# Patient Record
Sex: FEMALE | Race: ASIAN | NOT HISPANIC OR LATINO | URBAN - METROPOLITAN AREA
[De-identification: names, ages, dates, MRNs, and addresses within clinical notes are randomized per-mention and may not be internally consistent; named-entity substitution may affect disease eponyms.]

---

## 2020-03-06 ENCOUNTER — INPATIENT (INPATIENT)
Facility: HOSPITAL | Age: 30
LOS: 1 days | Discharge: HOME | End: 2020-03-08
Attending: SPECIALIST

## 2020-03-06 VITALS — SYSTOLIC BLOOD PRESSURE: 118 MMHG | HEART RATE: 80 BPM | DIASTOLIC BLOOD PRESSURE: 81 MMHG

## 2020-03-06 LAB
BASOPHILS # BLD AUTO: 0.01 K/UL — SIGNIFICANT CHANGE UP (ref 0–0.2)
BASOPHILS NFR BLD AUTO: 0.1 % — SIGNIFICANT CHANGE UP (ref 0–2)
BLD GP AB SCN SERPL QL: NEGATIVE — SIGNIFICANT CHANGE UP
EOSINOPHIL # BLD AUTO: 0.03 K/UL — SIGNIFICANT CHANGE UP (ref 0–0.5)
EOSINOPHIL NFR BLD AUTO: 0.4 % — SIGNIFICANT CHANGE UP (ref 0–6)
HCT VFR BLD CALC: 36.5 % — SIGNIFICANT CHANGE UP (ref 34.5–45)
HGB BLD-MCNC: 11.5 G/DL — SIGNIFICANT CHANGE UP (ref 11.5–15.5)
IMM GRANULOCYTES NFR BLD AUTO: 0.3 % — SIGNIFICANT CHANGE UP (ref 0–1.5)
LYMPHOCYTES # BLD AUTO: 1.51 K/UL — SIGNIFICANT CHANGE UP (ref 1–3.3)
LYMPHOCYTES # BLD AUTO: 19.9 % — SIGNIFICANT CHANGE UP (ref 13–44)
MCHC RBC-ENTMCNC: 28.2 PG — SIGNIFICANT CHANGE UP (ref 27–34)
MCHC RBC-ENTMCNC: 31.5 % — LOW (ref 32–36)
MCV RBC AUTO: 89.5 FL — SIGNIFICANT CHANGE UP (ref 80–100)
MONOCYTES # BLD AUTO: 0.5 K/UL — SIGNIFICANT CHANGE UP (ref 0–0.9)
MONOCYTES NFR BLD AUTO: 6.6 % — SIGNIFICANT CHANGE UP (ref 2–14)
NEUTROPHILS # BLD AUTO: 5.52 K/UL — SIGNIFICANT CHANGE UP (ref 1.8–7.4)
NEUTROPHILS NFR BLD AUTO: 72.7 % — SIGNIFICANT CHANGE UP (ref 43–77)
NRBC # FLD: 0 K/UL — SIGNIFICANT CHANGE UP (ref 0–0)
PLATELET # BLD AUTO: 199 K/UL — SIGNIFICANT CHANGE UP (ref 150–400)
PMV BLD: 10.5 FL — SIGNIFICANT CHANGE UP (ref 7–13)
RBC # BLD: 4.08 M/UL — SIGNIFICANT CHANGE UP (ref 3.8–5.2)
RBC # FLD: 13.7 % — SIGNIFICANT CHANGE UP (ref 10.3–14.5)
RH IG SCN BLD-IMP: POSITIVE — SIGNIFICANT CHANGE UP
WBC # BLD: 7.59 K/UL — SIGNIFICANT CHANGE UP (ref 3.8–10.5)
WBC # FLD AUTO: 7.59 K/UL — SIGNIFICANT CHANGE UP (ref 3.8–10.5)

## 2020-03-06 RX ORDER — CITRIC ACID/SODIUM CITRATE 300-500 MG
15 SOLUTION, ORAL ORAL EVERY 6 HOURS
Refills: 0 | Status: DISCONTINUED | OUTPATIENT
Start: 2020-03-06 | End: 2020-03-07

## 2020-03-06 RX ORDER — ONDANSETRON 8 MG/1
4 TABLET, FILM COATED ORAL ONCE
Refills: 0 | Status: COMPLETED | OUTPATIENT
Start: 2020-03-06 | End: 2020-03-06

## 2020-03-06 RX ORDER — SODIUM CHLORIDE 9 MG/ML
1000 INJECTION, SOLUTION INTRAVENOUS
Refills: 0 | Status: DISCONTINUED | OUTPATIENT
Start: 2020-03-06 | End: 2020-03-06

## 2020-03-06 RX ORDER — OXYTOCIN 10 UNIT/ML
333.33 VIAL (ML) INJECTION
Qty: 20 | Refills: 0 | Status: DISCONTINUED | OUTPATIENT
Start: 2020-03-06 | End: 2020-03-07

## 2020-03-06 RX ORDER — SODIUM CHLORIDE 9 MG/ML
1000 INJECTION, SOLUTION INTRAVENOUS ONCE
Refills: 0 | Status: DISCONTINUED | OUTPATIENT
Start: 2020-03-06 | End: 2020-03-08

## 2020-03-06 RX ORDER — SODIUM CHLORIDE 9 MG/ML
1000 INJECTION, SOLUTION INTRAVENOUS
Refills: 0 | Status: DISCONTINUED | OUTPATIENT
Start: 2020-03-06 | End: 2020-03-07

## 2020-03-06 RX ADMIN — ONDANSETRON 4 MILLIGRAM(S): 8 TABLET, FILM COATED ORAL at 23:12

## 2020-03-06 RX ADMIN — SODIUM CHLORIDE 125 MILLILITER(S): 9 INJECTION, SOLUTION INTRAVENOUS at 14:11

## 2020-03-06 NOTE — OB PROVIDER H&P - PMH
Left tubal pregnancy, unspecified whether intrauterine pregnancy present    Vaginal delivery  2018 female 5lbs 6 oz

## 2020-03-06 NOTE — OB PROVIDER H&P - HISTORY OF PRESENT ILLNESS
Ms. Fuentes is a 29 year old  at 39w0d who presents for induction due to IUGR, EFW 2400g. Dopplers unknown.   Denies CTX, LOF, VB. Patient reports normal fetal movement.  Patient is GBS negative.   PNC uncomplicated, denies history of gestational hypertension, gestational diabetes, preeclampsia,     OBHx: . History of 2 SAB occurring at age 19 and 23 without D/C. One ectopic pregnancy at age 26 treated with a left salpingectomy at [place].   One female FT  on 2018 with IUGR at 5#6.  GYNHx: Left salpingectomy for ectopic pregnancy at [place] in 2017. Denies fibroids, ovarian cysts, abnormal paps, STIs.  PMHx: Denies significant PMHx    Surgical HX: as above  Meds: Daily prenatal vitamins.  Allergies: Shellfish (scratchy throat), NKDA, NKEA.     FH: Brain tumor in cousin   Maternal uncle and grandmother diabetes.    SH: Prior history of smoking 2-3 cigarettes per day for about 10 years, stopped 3 years ago. Prior to pregnancy drank on average 2-3 8 ounce glasses of alcohol in a week. No alcohol during pregnancy.   Prior history of daily marijuana use for about 10 years stopping at age 26.

## 2020-03-06 NOTE — OB PROVIDER H&P - ASSESSMENT
29 year old  presents for induction due to IUGR, EFW 2400g  Labor: admit to L&D  -PO cytotec  -routine labs  -EFM/toco  -NPO, IV hydration  Fetal: cat 1 tracing, fetal status reassuring  GBS: neg  Analgesia: desires epidural      d/w Dr. Graf  Note by Raza Melo, PA-S  Reviewed by CHIQUITA Bermudez, PGY-2

## 2020-03-06 NOTE — CHART NOTE - NSCHARTNOTEFT_GEN_A_CORE
R1 OB Tracing Note    T(C): 36.9 (03-06-20 @ 22:15), Max: 36.9 (03-06-20 @ 17:31)  HR: 56 (03-06-20 @ 22:14) (56 - 80)  BP: 129/78 (03-06-20 @ 22:14) (112/63 - 129/78)  RR: 18 (03-06-20 @ 19:08) (18 - 18)  SpO2: --    EFM:  130 bpm, mod riana with periods of min riana, +accels, -decels  [place]: cx irregular on toco    A/P 29y P1 admitted for IUGR IOL  -Cont PO  -Cat 1 tracing    [place] PGY1

## 2020-03-07 LAB — T PALLIDUM AB TITR SER: NEGATIVE — SIGNIFICANT CHANGE UP

## 2020-03-07 RX ORDER — SIMETHICONE 80 MG/1
80 TABLET, CHEWABLE ORAL EVERY 4 HOURS
Refills: 0 | Status: DISCONTINUED | OUTPATIENT
Start: 2020-03-07 | End: 2020-03-08

## 2020-03-07 RX ORDER — IBUPROFEN 200 MG
600 TABLET ORAL EVERY 6 HOURS
Refills: 0 | Status: DISCONTINUED | OUTPATIENT
Start: 2020-03-07 | End: 2020-03-08

## 2020-03-07 RX ORDER — OXYTOCIN 10 UNIT/ML
333.33 VIAL (ML) INJECTION
Qty: 20 | Refills: 0 | Status: DISCONTINUED | OUTPATIENT
Start: 2020-03-07 | End: 2020-03-07

## 2020-03-07 RX ORDER — DIBUCAINE 1 %
1 OINTMENT (GRAM) RECTAL EVERY 6 HOURS
Refills: 0 | Status: DISCONTINUED | OUTPATIENT
Start: 2020-03-07 | End: 2020-03-08

## 2020-03-07 RX ORDER — OXYCODONE HYDROCHLORIDE 5 MG/1
5 TABLET ORAL
Refills: 0 | Status: DISCONTINUED | OUTPATIENT
Start: 2020-03-07 | End: 2020-03-08

## 2020-03-07 RX ORDER — IBUPROFEN 200 MG
600 TABLET ORAL EVERY 6 HOURS
Refills: 0 | Status: COMPLETED | OUTPATIENT
Start: 2020-03-07 | End: 2021-02-03

## 2020-03-07 RX ORDER — BENZOCAINE 10 %
1 GEL (GRAM) MUCOUS MEMBRANE EVERY 6 HOURS
Refills: 0 | Status: DISCONTINUED | OUTPATIENT
Start: 2020-03-07 | End: 2020-03-08

## 2020-03-07 RX ORDER — IBUPROFEN 200 MG
1 TABLET ORAL
Qty: 0 | Refills: 0 | DISCHARGE
Start: 2020-03-07

## 2020-03-07 RX ORDER — OXYCODONE HYDROCHLORIDE 5 MG/1
5 TABLET ORAL ONCE
Refills: 0 | Status: DISCONTINUED | OUTPATIENT
Start: 2020-03-07 | End: 2020-03-08

## 2020-03-07 RX ORDER — GLYCERIN ADULT
1 SUPPOSITORY, RECTAL RECTAL AT BEDTIME
Refills: 0 | Status: DISCONTINUED | OUTPATIENT
Start: 2020-03-07 | End: 2020-03-08

## 2020-03-07 RX ORDER — DIPHENHYDRAMINE HCL 50 MG
25 CAPSULE ORAL EVERY 6 HOURS
Refills: 0 | Status: DISCONTINUED | OUTPATIENT
Start: 2020-03-07 | End: 2020-03-08

## 2020-03-07 RX ORDER — MAGNESIUM HYDROXIDE 400 MG/1
30 TABLET, CHEWABLE ORAL
Refills: 0 | Status: DISCONTINUED | OUTPATIENT
Start: 2020-03-07 | End: 2020-03-08

## 2020-03-07 RX ORDER — HYDROCORTISONE 1 %
1 OINTMENT (GRAM) TOPICAL EVERY 6 HOURS
Refills: 0 | Status: DISCONTINUED | OUTPATIENT
Start: 2020-03-07 | End: 2020-03-08

## 2020-03-07 RX ORDER — LANOLIN
1 OINTMENT (GRAM) TOPICAL EVERY 6 HOURS
Refills: 0 | Status: DISCONTINUED | OUTPATIENT
Start: 2020-03-07 | End: 2020-03-08

## 2020-03-07 RX ORDER — AER TRAVELER 0.5 G/1
1 SOLUTION RECTAL; TOPICAL EVERY 4 HOURS
Refills: 0 | Status: DISCONTINUED | OUTPATIENT
Start: 2020-03-07 | End: 2020-03-08

## 2020-03-07 RX ORDER — KETOROLAC TROMETHAMINE 30 MG/ML
30 SYRINGE (ML) INJECTION ONCE
Refills: 0 | Status: DISCONTINUED | OUTPATIENT
Start: 2020-03-07 | End: 2020-03-07

## 2020-03-07 RX ORDER — TETANUS TOXOID, REDUCED DIPHTHERIA TOXOID AND ACELLULAR PERTUSSIS VACCINE, ADSORBED 5; 2.5; 8; 8; 2.5 [IU]/.5ML; [IU]/.5ML; UG/.5ML; UG/.5ML; UG/.5ML
0.5 SUSPENSION INTRAMUSCULAR ONCE
Refills: 0 | Status: DISCONTINUED | OUTPATIENT
Start: 2020-03-07 | End: 2020-03-08

## 2020-03-07 RX ORDER — SODIUM CHLORIDE 9 MG/ML
3 INJECTION INTRAMUSCULAR; INTRAVENOUS; SUBCUTANEOUS EVERY 8 HOURS
Refills: 0 | Status: DISCONTINUED | OUTPATIENT
Start: 2020-03-07 | End: 2020-03-08

## 2020-03-07 RX ORDER — PRAMOXINE HYDROCHLORIDE 150 MG/15G
1 AEROSOL, FOAM RECTAL EVERY 4 HOURS
Refills: 0 | Status: DISCONTINUED | OUTPATIENT
Start: 2020-03-07 | End: 2020-03-08

## 2020-03-07 RX ORDER — ACETAMINOPHEN 500 MG
975 TABLET ORAL
Refills: 0 | Status: DISCONTINUED | OUTPATIENT
Start: 2020-03-07 | End: 2020-03-08

## 2020-03-07 RX ADMIN — Medication 1000 MILLIUNIT(S)/MIN: at 02:09

## 2020-03-07 RX ADMIN — Medication 30 MILLIGRAM(S): at 04:00

## 2020-03-07 RX ADMIN — Medication 600 MILLIGRAM(S): at 15:55

## 2020-03-07 RX ADMIN — Medication 600 MILLIGRAM(S): at 14:59

## 2020-03-07 RX ADMIN — SODIUM CHLORIDE 3 MILLILITER(S): 9 INJECTION INTRAMUSCULAR; INTRAVENOUS; SUBCUTANEOUS at 15:00

## 2020-03-07 RX ADMIN — Medication 1 TABLET(S): at 14:59

## 2020-03-07 NOTE — CHART NOTE - NSCHARTNOTEFT_GEN_A_CORE
R1 Note 03-07-20 @ 01:00    Pt examined for progression    VITALS:  T(C): 36.9 (03-06-20 @ 22:15), Max: 36.9 (03-06-20 @ 17:31)  HR: 55 (03-07-20 @ 00:58) (51 - 80)  BP: 137/77 (03-07-20 @ 00:45) (112/63 - 137/77)  RR: 18 (03-06-20 @ 19:08) (18 - 18)  SpO2: 100% (03-07-20 @ 00:53) (100% - 100%)    EFM:  mod riana with periods of min riana, +accels -decels  [place]: Ctx irregular  VE: 3.5/60/-2    IMPRESSION:   FHR Category: 1  Additional:    PLAN:  Cont PO  Call for epi    d/w Dr. Homar Ramírez PGY1

## 2020-03-07 NOTE — LACTATION INITIAL EVALUATION - LACTATION INTERVENTIONS
Instructed and assisted with positioning to facilitate proper latch.  Infant is less than 24 hours old and reluctant at this time.  Safe skin to skin encouraged with frequent attempts.  Reviewed feeding log and taught hand expression.  Discussed outpatient resources available, warm line, breastfeeding support group .  Encouraged to call for assistance, as needed./initiate skin to skin/initiate hand expression routine

## 2020-03-07 NOTE — DISCHARGE NOTE OB - MATERIALS PROVIDED
Back To Sleep Handout/[facility]  Screening Program/Breastfeeding Log/[facility] Hearing Screen Program/Breastfeeding Mother’s Support Group Information/Guide to Postpartum Care/Shaken Baby Prevention Handout/Tdap Vaccination (VIS Pub Date: 2012)/Breastfeeding Guide and Packet/Birth Certificate Instructions/Vaccinations/  Immunization Record

## 2020-03-07 NOTE — OB POSTPARTUM EVENT NOTE - NS_EVENTPTSUMMARY1_OBGYN_ALL_OB_FT
lying- 114/75 HR 58 sitting- 114/59 HR 54 standing 105/55 HR 61. pt asymptomatic. ambulating in room. denies dizziness. tolerating regular diet.

## 2020-03-07 NOTE — DISCHARGE NOTE OB - CARE PLAN
Principal Discharge DX:	Vaginal delivery  Goal:	routine postpartum care  Assessment and plan of treatment:	follow up in 4 weeks/ regular diet & activity as tolerate

## 2020-03-07 NOTE — OB PROVIDER DELIVERY SUMMARY - NSPROVIDERDELIVERYNOTE_OBGYN_ALL_OB_FT
Spontaneous vaginal delivery of liveborn female infant from OA position. Head, shoulder and body delivered easily. Cord was clamped and cut and infant handed to mother. Spontaneous delivery of intact placenta. Inspection of the perineum revealed intact perineum, sulci and cervix. Fundus was firm, 2cm below umbilicus in midline. Sponge, lap and needle count was correct x2.

## 2020-03-07 NOTE — OB RN DELIVERY SUMMARY - NS_SEPSISRSKCALC_OBGYN_ALL_OB_FT
GBS status in the 'Prenatal Lab tests/results section' on the OB RN Patient Profile must be documented. EOS calculated successfully. EOS Risk Factor: 0.5/1000 live births ([facility] national incidence); GA=39w1d; Temp=98.42; ROM=0.15; GBS='Negative'; Antibiotics='No antibiotics or any antibiotics < 2 hrs prior to birth'

## 2020-03-07 NOTE — OB RN PATIENT PROFILE - ALERT: PERTINENT HISTORY
Fetal Non-Stress Test (NST)/20 Week Level II Sonogram/Ultra Screen at 12 Weeks/1st Trimester Sonogram/BioPhysical Profile(s)

## 2020-03-07 NOTE — DISCHARGE NOTE OB - CARE PROVIDER_API CALL
Lola Graf)  Obstetrics and Gynecology  [address] [place] [address], [address]  [place] [address]  Phone: [phone]  Fax: [phone]  Follow Up Time:

## 2020-03-07 NOTE — DISCHARGE NOTE OB - PATIENT PORTAL LINK FT
You can access the FollowMyHealth Patient Portal offered by [facility] by registering at the following website: [email or web address]. By joining [facility]’s FollowMyHealth portal, you will also be able to view your health information using other applications (apps) compatible with our system.

## 2020-03-08 VITALS
OXYGEN SATURATION: 99 % | RESPIRATION RATE: 18 BRPM | DIASTOLIC BLOOD PRESSURE: 55 MMHG | HEART RATE: 74 BPM | SYSTOLIC BLOOD PRESSURE: 116 MMHG | TEMPERATURE: 98 F

## 2020-03-08 RX ADMIN — Medication 600 MILLIGRAM(S): at 05:33

## 2020-03-08 RX ADMIN — Medication 600 MILLIGRAM(S): at 06:18

## 2020-03-08 NOTE — PROGRESS NOTE ADULT - SUBJECTIVE AND OBJECTIVE BOX
ANESTHESIA POST-EPIDURAL CHECK    29y Female s/p  DAY 1     No COMPLAINTS    NO APPARENT ANESTHESIA COMPLICATIONS

## 2020-11-06 NOTE — OB RN PATIENT PROFILE - NS PRO AD PATIENT TYPE
"Subjective   57-year-old female presents for evaluation of acute on chronic atraumatic left knee pain.  She states that she suffers from chronic knee pain but that it has been increased over the past 2 weeks.  She denies any fevers or systemic symptoms.  She states that over the past few days she has noted some redness and warmth to her left knee.  No drainage.  No paresthesias.  Pain is currently 7 out of 10 in severity with no aggravating or alleviating factors.  No preceding injury or trauma.          Review of Systems   Constitutional: Negative for fever.   Musculoskeletal: Positive for arthralgias and joint swelling.   All other systems reviewed and are negative.      Past Medical History:   Diagnosis Date   • Bulging lumbar disc    • Depression    • Hypertension    • Injury of back    • Panic attack    • Small intestine disorder     \"I have 2 spots on my small intestine\"       Allergies   Allergen Reactions   • Bactrim [Sulfamethoxazole-Trimethoprim] Itching and Rash     Has tolerated in the past       Past Surgical History:   Procedure Laterality Date   •  SECTION     • HYSTERECTOMY         History reviewed. No pertinent family history.    Social History     Socioeconomic History   • Marital status: Legally      Spouse name: Not on file   • Number of children: Not on file   • Years of education: Not on file   • Highest education level: Not on file   Tobacco Use   • Smoking status: Never Smoker   • Smokeless tobacco: Never Used   Substance and Sexual Activity   • Alcohol use: Yes     Comment: occ   • Drug use: No   • Sexual activity: Defer           Objective   Physical Exam  Vitals signs and nursing note reviewed.   Constitutional:       General: She is not in acute distress.     Appearance: Normal appearance. She is well-developed. She is not diaphoretic.      Comments: Nontoxic-appearing female   HENT:      Head: Normocephalic and atraumatic.   Neck:      Musculoskeletal: Normal range of " motion and neck supple.   Cardiovascular:      Rate and Rhythm: Normal rate.      Heart sounds: Normal heart sounds. No murmur. No friction rub. No gallop.    Pulmonary:      Effort: Pulmonary effort is normal. No respiratory distress.      Breath sounds: Normal breath sounds. No wheezing or rales.   Musculoskeletal: Normal range of motion.      Comments: Mild effusion noted to left knee, range of motion is within normal limits but painful, no joint laxity, negative anterior and posterior drawer signs   Skin:     General: Skin is warm and dry.      Findings: No erythema or rash.      Comments: No warmth erythema noted to left knee, no crepitus, no fluctuance, no induration, no pain out of proportion to exam   Neurological:      General: No focal deficit present.      Mental Status: She is alert and oriented to person, place, and time.      Comments: Left lower extremity is neurovascularly intact distally with bounding distal pulses normal sensation, normal gait   Psychiatric:         Mood and Affect: Mood normal.         Thought Content: Thought content normal.         Judgment: Judgment normal.         Joint Aspiration/Injection    Date/Time: 11/6/2020 2:19 PM  Performed by: Chong Bass MD  Authorized by: Chong Bass MD     Consent:     Consent obtained:  Verbal    Consent given by:  Patient    Risks discussed:  Bleeding, infection and pain    Alternatives discussed:  No treatment  Location:     Location:  Knee    Knee:  L knee  Anesthesia (see MAR for exact dosages):     Anesthesia method:  Local infiltration    Local anesthetic:  Lidocaine 1% WITH epi  Procedure details:     Preparation: Patient was prepped and draped in usual sterile fashion      Needle gauge:  18 G    Ultrasound guidance: no      Approach:  Medial    Aspirate amount:  5    Aspirate characteristics:  Blood-tinged and yellow    Steroid injected: no      Specimen collected: no    Post-procedure details:     Dressing:  Adhesive  "bandage    Patient tolerance of procedure:  Tolerated well, no immediate complications               ED Course  ED Course as of Nov 07 0549 Fri Nov 06, 2020   1418 57-year-old female presents for evaluation of atraumatic left knee pain.  She states that she suffers from chronic knee pain but over the past 2 weeks has been experiencing increased pain and swelling to her left knee when compared to baseline.  She also feels that over the past 2 days her knee has been \"red\" and \"warm\" when compared to baseline.  No fevers or systemic symptoms.  On arrival to the ED, the patient is nontoxic-appearing.  Exam remarkable for mild left knee effusion.  No laxity.  No overlying warmth erythema noted.  No crepitus.  No pain out of proportion to exam.  Labs remarkable only for mildly elevated CRP.  Plain films negative for fracture.  Arthrocentesis performed without complication under sterile conditions.  The patient tolerated the procedure well.    [DD]   1511 Synovial fluid is not suggestive of septic arthritis.  Patient reassured and counseled regarding symptomatic management.  She will follow-up with her primary care physician within the next week.  Crystal results are currently pending, and I will contact the patient if they are positive.  Agreeable with plan and given appropriate strict return precautions.    [DD]      ED Course User Index  [DD] Chong Bass MD                                   Recent Results (from the past 24 hour(s))   CBC Auto Differential    Collection Time: 11/06/20 12:40 PM    Specimen: Blood   Result Value Ref Range    WBC 5.62 3.40 - 10.80 10*3/mm3    RBC 4.29 3.77 - 5.28 10*6/mm3    Hemoglobin 13.3 12.0 - 15.9 g/dL    Hematocrit 40.2 34.0 - 46.6 %    MCV 93.7 79.0 - 97.0 fL    MCH 31.0 26.6 - 33.0 pg    MCHC 33.1 31.5 - 35.7 g/dL    RDW 11.8 (L) 12.3 - 15.4 %    RDW-SD 40.6 37.0 - 54.0 fl    MPV 10.1 6.0 - 12.0 fL    Platelets 253 140 - 450 10*3/mm3    Neutrophil % 49.5 42.7 - 76.0 %    " Lymphocyte % 43.6 19.6 - 45.3 %    Monocyte % 5.5 5.0 - 12.0 %    Eosinophil % 0.7 0.3 - 6.2 %    Basophil % 0.5 0.0 - 1.5 %    Immature Grans % 0.2 0.0 - 0.5 %    Neutrophils, Absolute 2.78 1.70 - 7.00 10*3/mm3    Lymphocytes, Absolute 2.45 0.70 - 3.10 10*3/mm3    Monocytes, Absolute 0.31 0.10 - 0.90 10*3/mm3    Eosinophils, Absolute 0.04 0.00 - 0.40 10*3/mm3    Basophils, Absolute 0.03 0.00 - 0.20 10*3/mm3    Immature Grans, Absolute 0.01 0.00 - 0.05 10*3/mm3    nRBC 0.0 0.0 - 0.2 /100 WBC   Basic Metabolic Panel    Collection Time: 11/06/20 12:40 PM    Specimen: Blood   Result Value Ref Range    Glucose 102 (H) 65 - 99 mg/dL    BUN 12 6 - 20 mg/dL    Creatinine 0.62 0.57 - 1.00 mg/dL    Sodium 142 136 - 145 mmol/L    Potassium 4.5 3.5 - 5.2 mmol/L    Chloride 105 98 - 107 mmol/L    CO2 27.0 22.0 - 29.0 mmol/L    Calcium 9.5 8.6 - 10.5 mg/dL    eGFR Non African Amer 100 >60 mL/min/1.73    BUN/Creatinine Ratio 19.4 7.0 - 25.0    Anion Gap 10.0 5.0 - 15.0 mmol/L   Sedimentation Rate    Collection Time: 11/06/20 12:40 PM    Specimen: Blood   Result Value Ref Range    Sed Rate 9 0 - 30 mm/hr   C-reactive Protein    Collection Time: 11/06/20 12:40 PM    Specimen: Blood   Result Value Ref Range    C-Reactive Protein 1.09 (H) 0.00 - 0.50 mg/dL   Crystal Exam, Fluid - Synovial Fluid,    Collection Time: 11/06/20  2:17 PM    Specimen: Synovial Fluid   Result Value Ref Range    Crystals, Fluid No crystals seen    Body fluid cell count - Synovial Fluid, Knee, Left    Collection Time: 11/06/20  2:17 PM    Specimen: Knee, Left; Synovial Fluid   Result Value Ref Range    Color, Fluid Yellow     Appearance, Fluid Hazy (A) Clear    WBC, Fluid 93 /mm3    RBC, Fluid 6,000   /mm3   Body fluid differential - Synovial Fluid, Knee, Left    Collection Time: 11/06/20  2:17 PM    Specimen: Knee, Left; Synovial Fluid   Result Value Ref Range    Neutrophils, Fluid 16 %    Lymphocytes, Fluid 38 %    Monocytes, Fluid 46 %   Body Fluid Culture  "- Synovial Fluid, Knee, Left    Collection Time: 11/06/20  2:17 PM    Specimen: Knee, Left; Synovial Fluid   Result Value Ref Range    Gram Stain Few (2+) WBCs seen     Gram Stain No organisms seen      Note: In addition to lab results from this visit, the labs listed above may include labs taken at another facility or during a different encounter within the last 24 hours. Please correlate lab times with ED admission and discharge times for further clarification of the services performed during this visit.    XR Knee 1 or 2 View Left   Final Result   No acute bony abnormality.           D:  11/06/2020   E:  11/06/2020       This report was finalized on 11/6/2020 3:18 PM by Dr. Rose Aguilera MD.            Vitals:    11/06/20 1225 11/06/20 1353 11/06/20 1430   BP: 144/98 138/79 135/79   BP Location: Left arm Right arm    Patient Position: Sitting Lying    Pulse: 88 70    Resp: 20     Temp: 96.8 °F (36 °C)     TempSrc: Infrared     SpO2: 99% 100% 94%   Weight: 81.6 kg (180 lb)     Height: 165.1 cm (65\")       Medications   lidocaine-EPINEPHrine (XYLOCAINE W/EPI) 1 %-1:697920 injection 10 mL (10 mL Injection Given by Other 11/6/20 1353)     ECG/EMG Results (last 24 hours)     ** No results found for the last 24 hours. **        No orders to display               MDM    Final diagnoses:   Left medial knee pain            Chong Bass MD  11/07/20 0550    " Health Care Proxy (HCP)

## 2024-09-06 NOTE — OB RN PATIENT PROFILE - FLU SEASON?
No lesions,  no deformities, chest wall non-tender,  no masses present, breathing is unlabored without, normal breath sounds.  Yes...
